# Patient Record
Sex: MALE | Race: BLACK OR AFRICAN AMERICAN | NOT HISPANIC OR LATINO | Employment: FULL TIME | URBAN - METROPOLITAN AREA
[De-identification: names, ages, dates, MRNs, and addresses within clinical notes are randomized per-mention and may not be internally consistent; named-entity substitution may affect disease eponyms.]

---

## 2024-06-19 ENCOUNTER — HOSPITAL ENCOUNTER (EMERGENCY)
Facility: HOSPITAL | Age: 29
Discharge: HOME | End: 2024-06-19
Attending: EMERGENCY MEDICINE
Payer: COMMERCIAL

## 2024-06-19 VITALS
OXYGEN SATURATION: 97 % | RESPIRATION RATE: 16 BRPM | BODY MASS INDEX: 42.66 KG/M2 | HEART RATE: 65 BPM | WEIGHT: 315 LBS | SYSTOLIC BLOOD PRESSURE: 154 MMHG | HEIGHT: 72 IN | TEMPERATURE: 98 F | DIASTOLIC BLOOD PRESSURE: 84 MMHG

## 2024-06-19 DIAGNOSIS — J40 BRONCHITIS: Primary | ICD-10-CM

## 2024-06-19 DIAGNOSIS — R03.0 ELEVATED BLOOD PRESSURE READING: ICD-10-CM

## 2024-06-19 DIAGNOSIS — J06.9 UPPER RESPIRATORY TRACT INFECTION, UNSPECIFIED TYPE: ICD-10-CM

## 2024-06-19 LAB
FLUAV AG UPPER RESP QL IA.RAPID: NOT DETECTED
FLUBV AG UPPER RESP QL IA.RAPID: NOT DETECTED
SARS-COV-2 RNA RESP QL NAA+PROBE: NOT DETECTED

## 2024-06-19 PROCEDURE — 0240U COVID/FLU A&B PCR: CPT | Performed by: EMERGENCY MEDICINE

## 2024-06-19 PROCEDURE — 99282 EMERGENCY DEPT VISIT SF MDM: CPT

## 2024-06-19 NOTE — Clinical Note
"Marquis Giron" Susan was seen and treated in our emergency department on 6/19/2024.  He may return to work on 06/20/2024.       If you have any questions or concerns, please don't hesitate to call.       RN    "

## 2024-06-19 NOTE — ED PROVIDER NOTES
Encounter Date: 6/19/2024       History     Chief Complaint   Patient presents with    Cough     Cough w/ green sputum x1 wk     28 y/o BM presents with one week h/o cough and mild nasal congestion. Occ sputum /phlegm: yellow at times.  No fever/chills; does not feel bad; works outside ; drinks lots of water; no body aches; no trouble with urination; no n/v/d; no rash/ no headache. No SOB/ no chest pain; no palpitations; no syncope or near syncope; no earache; no sore throat  He thought maybe he has covid. Has not tried any OTC meds.  PMH  Asthma as a kid; nonsmoker      Review of patient's allergies indicates:  No Known Allergies  No past medical history on file.  No past surgical history on file.  No family history on file.     Review of Systems   Constitutional: Negative.    HENT:  Positive for congestion. Negative for mouth sores, postnasal drip, sinus pressure, sinus pain, sneezing, sore throat, tinnitus, trouble swallowing and voice change.    Respiratory:  Positive for cough. Negative for shortness of breath.    Cardiovascular: Negative.  Negative for chest pain, palpitations and leg swelling.   Gastrointestinal: Negative.    Musculoskeletal: Negative.    Skin: Negative.    Neurological: Negative.        Physical Exam     Initial Vitals [06/19/24 0640]   BP Pulse Resp Temp SpO2   (!) 163/95 62 16 97.7 °F (36.5 °C) 100 %      MAP       --         Physical Exam    Vitals reviewed.  Constitutional: He appears well-developed and well-nourished. He is Obese .   Smiling; laughing; no distress   HENT:   Head: Normocephalic and atraumatic.   Eyes: Conjunctivae and EOM are normal. Pupils are equal, round, and reactive to light.   Pulmonary/Chest: Breath sounds normal. No respiratory distress. He has no wheezes. He has no rhonchi. He has no rales. He exhibits no tenderness.   Abdominal: Abdomen is soft.     Neurological: He is alert and oriented to person, place, and time. He has normal reflexes.   Skin: Skin is warm  and dry. Capillary refill takes less than 2 seconds.         ED Course   Procedures  Labs Reviewed   COVID/FLU A&B PCR - Normal    Narrative:     The Xpert Xpress SARS-CoV-2/FLU/RSV plus is a rapid, multiplexed real-time PCR test intended for the simultaneous qualitative detection and differentiation of SARS-CoV-2, Influenza A, Influenza B, and respiratory syncytial virus (RSV) viral RNA in either nasopharyngeal swab or nasal swab specimens.                Imaging Results    None          Medications - No data to display  Medical Decision Making  Viral syndrome, bronchitis, pneumonia    Amount and/or Complexity of Data Reviewed  Labs:      Details: 06/19/24 06:41  Influenza A, Molecular: Not Detected  Influenza B, Molecular: Not Detected  SARS-CoV2 (COVID-19) Qualitative PCR: Not Detected               ED Course as of 06/19/24 0803   Wed Jun 19, 2024   0802 We discussed that lungs were clear and he had a negative flu and COVID.  He really feels fine at this point.  He does have elevated blood pressure so he needs to follow up with the PCP about that.  Recommended over-the-counter medications for his cough and congestion.  Return for any worsening [RD]      ED Course User Index  [RD] Isabel Glover MD                           Clinical Impression:  Final diagnoses:  [J40] Bronchitis (Primary)  [J06.9] Upper respiratory tract infection, unspecified type  [R03.0] Elevated blood pressure reading          ED Disposition Condition    Discharge Stable          ED Prescriptions    None       Follow-up Information       Follow up With Specialties Details Why Contact Info    PCP   Scheduled with primary to re-evaluate blood pressure PCP 1-2 days; return to ed for any worsening             Isabel Glover MD  06/19/24 0803